# Patient Record
Sex: MALE | Race: OTHER | HISPANIC OR LATINO
[De-identification: names, ages, dates, MRNs, and addresses within clinical notes are randomized per-mention and may not be internally consistent; named-entity substitution may affect disease eponyms.]

---

## 2022-01-14 ENCOUNTER — TRANSCRIPTION ENCOUNTER (OUTPATIENT)
Age: 32
End: 2022-01-14

## 2022-01-15 ENCOUNTER — EMERGENCY (EMERGENCY)
Facility: HOSPITAL | Age: 32
LOS: 0 days | Discharge: HOME | End: 2022-01-15
Attending: EMERGENCY MEDICINE | Admitting: EMERGENCY MEDICINE
Payer: COMMERCIAL

## 2022-01-15 VITALS
HEART RATE: 79 BPM | TEMPERATURE: 98 F | RESPIRATION RATE: 18 BRPM | OXYGEN SATURATION: 99 % | SYSTOLIC BLOOD PRESSURE: 113 MMHG | WEIGHT: 188.05 LBS | DIASTOLIC BLOOD PRESSURE: 61 MMHG

## 2022-01-15 DIAGNOSIS — M54.50 LOW BACK PAIN, UNSPECIFIED: ICD-10-CM

## 2022-01-15 DIAGNOSIS — M54.9 DORSALGIA, UNSPECIFIED: ICD-10-CM

## 2022-01-15 PROCEDURE — 99284 EMERGENCY DEPT VISIT MOD MDM: CPT

## 2022-01-15 NOTE — ED PROVIDER NOTE - PHYSICAL EXAMINATION
CONSTITUTIONAL: Well-developed; well-nourished; in no acute distress.   SKIN: warm, dry  HEAD: Normocephalic; atraumatic.  EYES: PERRL, EOMI, normal sclera and conjunctiva   ENT: No nasal discharge; airway clear.  NECK: Supple; non tender.  CARD: S1, S2 normal; no murmurs, gallops, or rubs. Regular rate and rhythm.   RESP: No wheezes, rales or rhonchi.  ABD: soft ntnd  MSK: TTP lower L lumbar paraspinal, no ecchymosis or erythema    EXT: Normal ROM.  No clubbing, cyanosis or edema.   LYMPH: No acute cervical adenopathy.  NEURO: Alert, oriented, grossly unremarkable  PSYCH: Cooperative, appropriate.

## 2022-01-15 NOTE — ED ADULT NURSE NOTE - OBJECTIVE STATEMENT
pt presents with left lower back pain that started x 4 days ago, worsened after lifting while at work. denies numbness to LE or radiating pain

## 2022-01-15 NOTE — ED PROVIDER NOTE - PATIENT PORTAL LINK FT
You can access the FollowMyHealth Patient Portal offered by [facility] by registering at the following website: [email or web address]. By joining [employer]’s FollowMyHealth portal, you will also be able to view your health information using other applications (apps) compatible with our system.

## 2022-01-15 NOTE — ED PROVIDER NOTE - ATTENDING CONTRIBUTION TO CARE
30 yo male without any pertinent PMH c/o rt side low back pain for several days.  Sharp, worse with movement, non-radiating , no associated fever, chills, bowel or bladder dysfunction, perineal anesthesia, no focal weakness or paresthesias, no weight loss or other acute complaints.  No red flag.  Patient appears very well, exam is nml except + left lumbar paraspinal muscle ttp , no midline spine ttp, nml neuro exam, nml gait.    Imp: lumbar strain, stable for d/.c home, strict return precautions given, advised to follow up with his PD next week.  Patient verbalized understanding and is amenable with the plan.

## 2022-01-15 NOTE — ED PROVIDER NOTE - NSFOLLOWUPCLINICS_GEN_ALL_ED_FT
[facility] ([facility])  Rehabilitation  [facility] [address] [place] [address]  Phone: [phone]  Fax:   Follow Up Time: 1-3 Days

## 2022-01-15 NOTE — ED ADULT TRIAGE NOTE - CHIEF COMPLAINT QUOTE
pt c/o mid to right back pain, received toradol shot at urgent care with minimal relief. took cyclobenzapine and motrin prior to arrival.

## 2022-01-15 NOTE — ED PROVIDER NOTE - OBJECTIVE STATEMENT
30 yo male no pmhx presenting with mild lower back pain for the past few days worse with movement and bending down. no fever, urinary retention, weakness, numbness, tingling.

## 2022-01-15 NOTE — ED PROVIDER NOTE - NS ED ROS FT
Constitutional:  see HPI  Head:  no headache, dizziness, loss of consciousness  Eyes:  no visual changes; no eye pain, redness, or discharge  ENMT:  no ear pain or discharge; no hearing problems; no mouth or throat sores or lesions; no throat pain  Cardiac: no chest pain, tachycardia or palpitations  Respiratory: no cough, wheezing, shortness of breath, chest tightness, or trouble breathing  GI: no nausea, vomiting, diarrhea or abdominal pain  :  no dysuria, frequency, or burning with urination; no change in urine output  MS: back pain  Neuro: no weakness; no numbness or tingling; no seizure  Skin:  no rashes or color changes; no lacerations or abrasions

## 2022-05-15 ENCOUNTER — NON-APPOINTMENT (OUTPATIENT)
Age: 32
End: 2022-05-15

## 2022-05-23 ENCOUNTER — NON-APPOINTMENT (OUTPATIENT)
Age: 32
End: 2022-05-23

## 2022-07-16 ENCOUNTER — NON-APPOINTMENT (OUTPATIENT)
Age: 32
End: 2022-07-16

## 2023-05-08 ENCOUNTER — NON-APPOINTMENT (OUTPATIENT)
Age: 33
End: 2023-05-08